# Patient Record
(demographics unavailable — no encounter records)

---

## 2024-11-07 NOTE — PHYSICAL EXAM
[TextEntry] : There is pain with mild swelling on the dorsal aspect of the right foot.  Active and passive dorsiflexion of the foot at the ankle joint is painful.  Ecchymosis is absent.

## 2024-11-07 NOTE — HISTORY OF PRESENT ILLNESS
[FreeTextEntry1] : Palomo Reyes, returns after a long absence with her grandmother present stating that over the past week or so she has been having a lot of pain on the top of her right foot.  She states that it hurts a lot.  She states at times any shoe touching the top of the foot is painful.  She is not involved in sports right now.  She denies any trauma or injury.  Her grandmother stated that she was concerned because she is continually complaining about the foot and does not normally complain.

## 2024-11-07 NOTE — ASSESSMENT
[FreeTextEntry1] : Assessment: Extensor tendinitis, right foot.  Plan: DP, medial oblique, and lateral weightbearing radiographs are taken of the right foot and reveal no fracture or dislocation.  After further assessment the right foot was placed in approximately 90 degrees to the ankle with the foot in as close to neutral position as possible.  The foot and ankle were prepped to be clean and dry.  Pre tape spray may have been used on the foot and ankle as indicated.  Pre-tape adhesive spray provides an extra sticky, waterproof barrier, that helps tapes and strapping stick better to the skin. Perfect for oily skin, sweaty skin, water sports and extreme environments. It can be a game-changer when strapping challenging areas such as sweaty or wet ankles & feet. I then applied a strapping to the right foot and ankle for support and compression to help relieve pressure and symptoms related to the patient's foot/ankle problem.   The strapping can aid recovery by supporting the muscles, tendons, or ligaments around the affected painful area, as well as improving blood flow to the area. Plus, the strapping will reduce pain and improve the gait, preventing further injury from poor alignment. The strapping also provides proprioceptive feedback. The strapping can reduce the amount of stretching and moving the ligaments do when the patient is weight bearing. This not only gives tissues a better chance to heal, but it also helps prevent further damage. This strapping also minimizes foot pronation, collapse, or flattening which causes over use and irritation to the muscle, tendons, and ligaments in the foot. Often, relief of pain with the strapping is a diagnostic indication for the need of functional orthotic devices. In general, strappings may be used to treat strains, sprains, dislocations, and some fractures. Strapping the foot and ankle provides the external stabilization that stretched ligaments (tissues connecting bone to bone) need while they heal. Most studies show that strapping the foot and ankle decreases the incidence and severity of the affected area.  Besides physical support, strapping can increase biofeedback and increase proprioception, which is a body's ability to know where it is thus aiding in healing and reducing further exacerbation of the pain. The patient was instructed to leave the strapping in place for the next few days to a week, if it was comfortable.  The patient was advised to keep the strapping dry, but leave it on and dry with a hair dryer if it got wet.   The patient was further instructed that if the strapping was uncomfortable or causes any problems it should be removed right away and the office notified.  The patient was instructed to rest, ice and elevate the foot as much as possible.  She is to take ibuprofen in the appropriate dose b.i.d. for the next four days.  She will limit her weightbearing and walking activities as warranted.  If pain persists in two weeks, she will return.